# Patient Record
Sex: FEMALE | Race: WHITE
[De-identification: names, ages, dates, MRNs, and addresses within clinical notes are randomized per-mention and may not be internally consistent; named-entity substitution may affect disease eponyms.]

---

## 2020-06-12 ENCOUNTER — HOSPITAL ENCOUNTER (EMERGENCY)
Dept: HOSPITAL 11 - JP.ED | Age: 80
Discharge: NURSING, REHAB OR LONG-TERM CARE | End: 2020-06-12
Payer: MEDICARE

## 2020-06-12 DIAGNOSIS — Z79.899: ICD-10-CM

## 2020-06-12 DIAGNOSIS — Z79.82: ICD-10-CM

## 2020-06-12 DIAGNOSIS — S72.142A: Primary | ICD-10-CM
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
## 2020-06-12 DIAGNOSIS — Z88.0: ICD-10-CM

## 2020-06-12 PROCEDURE — 99285 EMERGENCY DEPT VISIT HI MDM: CPT

## 2020-06-12 PROCEDURE — 51702 INSERT TEMP BLADDER CATH: CPT

## 2020-06-12 PROCEDURE — 96376 TX/PRO/DX INJ SAME DRUG ADON: CPT

## 2020-06-12 PROCEDURE — 73700 CT LOWER EXTREMITY W/O DYE: CPT

## 2020-06-12 PROCEDURE — 73552 X-RAY EXAM OF FEMUR 2/>: CPT

## 2020-06-12 PROCEDURE — 96374 THER/PROPH/DIAG INJ IV PUSH: CPT

## 2020-06-12 NOTE — EDM.PDOC
ED HPI GENERAL MEDICAL PROBLEM





- General


Chief Complaint: Lower Extremity Injury/Pain


Stated Complaint: FALL VIA NORTH


Time Seen by Provider: 06/12/20 12:30


Source of Information: Reports: Patient, EMS


History Limitations: Reports: No Limitations





- History of Present Illness


INITIAL COMMENTS - FREE TEXT/NARRATIVE: 


January is a 80 year old female who presents to the ED today via EMS after she 

tripped on a cupboard door and fell onto her left hip, arrives here complaining 

of left hip pain.  Denies any other injuries, denies hitting her head, not on 

blood thinners.  Patient denies any LOC.  Patient reports hx of "severe 

osteoporosis". Patient was given Dilaudid and Fentanyl en route and now c/o 

pain 3/10.  


Onset: Today, Sudden


  ** Left Hip


Pain Score (Numeric/FACES): 2





- Related Data


 Allergies











Allergy/AdvReac Type Severity Reaction Status Date / Time


 


Penicillins Allergy  Cannot Verified 06/12/20 12:40





   Remember  











Home Meds: 


 Home Meds





Aspirin 81 mg PO DAILY 06/12/20 [History]


Omeprazole 40 mg PO ACBREAKFAST 06/12/20 [History]


atorvaSTATin [Lipitor] 80 mg PO BEDTIME 06/12/20 [History]











Review of Systems





- Review of Systems


Review Of Systems: Comprehensive ROS is negative, except as noted in HPI.





ED EXAM, GENERAL





- Physical Exam


Exam: See Below


Exam Limited By: Physical Impairment


General Appearance: Alert, WD/WN, No Apparent Distress


Eye Exam: Bilateral Eye: EOMI, PERRL


Ears: Normal External Exam


Throat/Mouth: Normal Oropharynx


Head: Atraumatic


Neck: Normal Inspection, Supple, Non-Tender, Full Range of Motion.  No: Tender 

Midline


Respiratory/Chest: No Respiratory Distress, Rhonchi (scattered throughout, 

patient is a smoker)


Cardiovascular: Normal Peripheral Pulses


GI/Abdominal: Soft, Non-Tender


Extremities: Leg Pain, Limited Range of Motion, Other (left leg slighlty 

shorteneted, rotated, pain to left pelvis with rock no deformity)


Neurological: Alert, Oriented, CN II-XII Intact


Psychiatric: Normal Affect, Normal Mood


Lymphatic: No Adenopathy





Course





- Vital Signs


Last Recorded V/S: 


 Last Vital Signs











Temp  35.8 C L  06/12/20 13:01


 


Pulse  64   06/12/20 13:01


 


Resp  20   06/12/20 13:01


 


BP  184/116 H  06/12/20 13:01


 


Pulse Ox  91 L  06/12/20 13:01








January is a 80 year old female, presents to the ED today via EMS with left hip 

pain after she tripped and fell landing on left hip/side, patient denies any 

other injuries. Please refer to HPI and focused exam.  Patient arrives here HTN

, afebrile.  oxygen saturation of 91%, likely side effect of medication.  

Patient given Dilaudid here, Pelvic CT and left femur xray obtained, both 

showing an intertrochanteric fracture of the left femur.  Patient updated, 

unfortunately we do not have orthopedics available here today, I discussed 

patient case with Zenaida Elias, orthopedic provider at [facility] who has 

graciously accepted patient for transfer.  Patient and her sister updated on 

plan of care.  Yates catheter placed.  Patient transferred in stable condition. 





- Orders/Labs/Meds


Orders: 


 Active Orders 24 hr











 Category Date Time Status


 


 Yates Catheter Insertion [Insert Urinary Catheter] [OM. Care  06/12/20 14:30 

Ordered





 PC] Q24H   


 


 Urinary Catheter Assessment [RC] ASDIRECTED Care  06/12/20 14:20 Ordered


 


 HYDROmorphone [Dilaudid] Med  06/12/20 12:34 Active





 0.5 mg IVPUSH Q1H PRN   








 Medication Orders





Hydromorphone HCl (Dilaudid)  0.5 mg IVPUSH Q1H PRN


   PRN Reason: Pain


   Last Admin: 06/12/20 12:53  Dose: 0.5 mg








Meds: 


Medications











Generic Name Dose Route Start Last Admin





  Trade Name Freq  PRN Reason Stop Dose Admin


 


Hydromorphone HCl  0.5 mg  06/12/20 12:34  06/12/20 12:53





  Dilaudid  IVPUSH   0.5 mg





  Q1H PRN   Administration





  Pain   





     





     





     














Departure





- Departure


Time of Disposition: 15:30


Disposition: DC/[facility] to [facility]


Condition: Fair


Clinical Impression: 


 Intertrochanteric fracture, hip





Fall


Qualifiers:


 Encounter type: initial encounter Qualified Code(s): W19.XXXA - Unspecified 

fall, initial encounter








- Discharge Information


Referrals: 


PCP,None [Primary Care Provider] - 


Forms:  ED Department Discharge





Sepsis Event Note (ED)





- Focused Exam


Vital Signs: 


 Vital Signs











  Temp Pulse Resp BP Pulse Ox


 


 06/12/20 13:01  35.8 C L  64  20  184/116 H  91 L


 


 06/12/20 12:35  35.8 C L  64  20  184/116 H  91 L














- My Orders


Last 24 Hours: 


My Active Orders





06/12/20 12:34


HYDROmorphone [Dilaudid]   0.5 mg IVPUSH Q1H PRN 





06/12/20 14:20


Urinary Catheter Assessment [RC] ASDIRECTED 





06/12/20 14:30


Yates Catheter Insertion [Insert Urinary Catheter] [OM.PC] Q24H 














- Assessment/Plan


Last 24 Hours: 


My Active Orders





06/12/20 12:34


HYDROmorphone [Dilaudid]   0.5 mg IVPUSH Q1H PRN 





06/12/20 14:20


Urinary Catheter Assessment [RC] ASDIRECTED 





06/12/20 14:30


Yates Catheter Insertion [Insert Urinary Catheter] [OM.PC] Q24H

## 2020-06-12 NOTE — CR
Femur Min 2V Lt

 

CLINICAL HISTORY: Fall, pain

 

FINDINGS: There is a calcified fracture through the trochanteric portion of the

left femur with slight angulation. There are some osteoarthritic change in the

hip. Bones are osteopenic. There is vascular calcification

 

Impression: Intertrochanteric fracture left hip

## 2020-06-12 NOTE — CT
Hip wo Cont Lt

 

CLINICAL HISTORY: Fall, pain 

 

COMPARISON: Plain image series

 

TECHNIQUE: Multiple contiguous axial sections were obtained from the level of

the iliac crests down to the pubic symphysis without contrast enhancement. As

described dose

 

FINDINGS: There is a slightly displaced to the comminuted intertrochanteric

fracture of the left femur. There is some periarticular spurring in the left

hip. Pelvis appears intact. There is no significant hematoma

 

IMPRESSION: Comminuted intertrochanteric fracture left femur